# Patient Record
Sex: FEMALE | Race: BLACK OR AFRICAN AMERICAN | Employment: FULL TIME | URBAN - METROPOLITAN AREA
[De-identification: names, ages, dates, MRNs, and addresses within clinical notes are randomized per-mention and may not be internally consistent; named-entity substitution may affect disease eponyms.]

---

## 2021-03-10 ENCOUNTER — HOSPITAL ENCOUNTER (OUTPATIENT)
Dept: WOMENS IMAGING | Age: 61
Discharge: HOME | End: 2021-03-10
Payer: OTHER GOVERNMENT

## 2021-03-10 DIAGNOSIS — Z12.31 BREAST CANCER SCREENING BY MAMMOGRAM: ICD-10-CM

## 2021-03-10 PROCEDURE — 77067 SCR MAMMO BI INCL CAD: CPT

## 2021-04-05 ENCOUNTER — TELEPHONE (OUTPATIENT)
Dept: WOMENS IMAGING | Age: 61
End: 2021-04-05

## 2021-04-29 ENCOUNTER — HOSPITAL ENCOUNTER (OUTPATIENT)
Dept: WOMENS IMAGING | Age: 61
Discharge: HOME | End: 2021-04-29
Payer: OTHER GOVERNMENT

## 2021-04-29 ENCOUNTER — HOSPITAL ENCOUNTER (OUTPATIENT)
Dept: ULTRASOUND IMAGING | Age: 61
Discharge: HOME | End: 2021-04-29
Payer: OTHER GOVERNMENT

## 2021-04-29 DIAGNOSIS — R92.8 ABNORMAL MAMMOGRAM: ICD-10-CM

## 2021-04-29 PROCEDURE — 76642 ULTRASOUND BREAST LIMITED: CPT

## 2021-04-29 PROCEDURE — 77065 DX MAMMO INCL CAD UNI: CPT

## 2021-05-04 ENCOUNTER — HOSPITAL ENCOUNTER (EMERGENCY)
Age: 61
Discharge: HOME | End: 2021-05-04
Payer: OTHER GOVERNMENT

## 2021-05-04 ENCOUNTER — APPOINTMENT (OUTPATIENT)
Dept: GENERAL RADIOLOGY | Age: 61
End: 2021-05-04
Payer: OTHER GOVERNMENT

## 2021-05-04 VITALS
WEIGHT: 224 LBS | DIASTOLIC BLOOD PRESSURE: 113 MMHG | HEART RATE: 78 BPM | OXYGEN SATURATION: 96 % | TEMPERATURE: 98 F | RESPIRATION RATE: 12 BRPM | BODY MASS INDEX: 39.69 KG/M2 | HEIGHT: 63 IN | SYSTOLIC BLOOD PRESSURE: 185 MMHG

## 2021-05-04 DIAGNOSIS — J06.9 URI WITH COUGH AND CONGESTION: Primary | ICD-10-CM

## 2021-05-04 PROCEDURE — 99284 EMERGENCY DEPT VISIT MOD MDM: CPT

## 2021-05-04 PROCEDURE — 6370000000 HC RX 637 (ALT 250 FOR IP): Performed by: PHYSICIAN ASSISTANT

## 2021-05-04 PROCEDURE — 94640 AIRWAY INHALATION TREATMENT: CPT

## 2021-05-04 PROCEDURE — 94760 N-INVAS EAR/PLS OXIMETRY 1: CPT

## 2021-05-04 PROCEDURE — 71045 X-RAY EXAM CHEST 1 VIEW: CPT

## 2021-05-04 RX ORDER — IPRATROPIUM BROMIDE AND ALBUTEROL SULFATE 2.5; .5 MG/3ML; MG/3ML
1 SOLUTION RESPIRATORY (INHALATION) ONCE
Status: COMPLETED | OUTPATIENT
Start: 2021-05-04 | End: 2021-05-04

## 2021-05-04 RX ORDER — PREDNISONE 20 MG/1
60 TABLET ORAL ONCE
Status: COMPLETED | OUTPATIENT
Start: 2021-05-04 | End: 2021-05-04

## 2021-05-04 RX ORDER — GUAIFENESIN AND CODEINE PHOSPHATE 100; 10 MG/5ML; MG/5ML
5 SOLUTION ORAL 3 TIMES DAILY PRN
Qty: 75 ML | Refills: 0 | Status: SHIPPED | OUTPATIENT
Start: 2021-05-04 | End: 2021-05-09

## 2021-05-04 RX ORDER — PREDNISONE 10 MG/1
60 TABLET ORAL DAILY
Qty: 30 TABLET | Refills: 0 | Status: SHIPPED | OUTPATIENT
Start: 2021-05-04 | End: 2021-05-09

## 2021-05-04 RX ORDER — ALBUTEROL SULFATE 90 UG/1
AEROSOL, METERED RESPIRATORY (INHALATION)
Qty: 1 INHALER | Refills: 0 | Status: SHIPPED | OUTPATIENT
Start: 2021-05-04

## 2021-05-04 RX ADMIN — IPRATROPIUM BROMIDE AND ALBUTEROL SULFATE 1 AMPULE: .5; 3 SOLUTION RESPIRATORY (INHALATION) at 09:00

## 2021-05-04 RX ADMIN — PREDNISONE 60 MG: 20 TABLET ORAL at 08:38

## 2021-05-04 ASSESSMENT — ENCOUNTER SYMPTOMS
VOMITING: 0
EYE REDNESS: 0
TROUBLE SWALLOWING: 0
ABDOMINAL PAIN: 0
VOICE CHANGE: 0
NAUSEA: 0
COLOR CHANGE: 0
CHEST TIGHTNESS: 0
CONSTIPATION: 0
BACK PAIN: 0
DIARRHEA: 0
EYE DISCHARGE: 0
RHINORRHEA: 0
SINUS PAIN: 0
COUGH: 1
SINUS PRESSURE: 0
SORE THROAT: 0
SHORTNESS OF BREATH: 0

## 2021-05-04 NOTE — ED PROVIDER NOTES
[address] [facility]        Pt Name: Graciela Stewart  MRN: [id number]  Birthdate 1960  Date of evaluation: 5/4/2021  Provider: OMAR Melgar  PCP: No primary care provider on file. ALLIE. I have evaluated this patient. My supervising physician was available for consultation. CHIEF COMPLAINT       Chief Complaint   Patient presents with    Cough     Patient with complaints of dry cough x 3 days. Denies fever. HISTORY OF PRESENT ILLNESS   (Location, Timing/Onset, Context/Setting, Quality, Duration, Modifying Factors, Severity, Associated Signs and Symptoms)  Note limiting factors. Graciela Stewart is a 61 y.o. female with past medical history of asthma who presents to the ED with complaint of upper respiratory symptoms. Patient that she has had dry nonproductive cough for the past 3 days. States she has had some chest congestion. Patient that she been taking over-the-counter Mucinex with minimal improvement of symptoms. Patient became concerned and came to the ED for further evaluation and treatment. Patient she does have a history of asthma and states normally around this time of year when the weather changes she gets flareups of her asthma that appears similar to today. Patient states she normally gets a chest congestion with associated cough. Patient denies any sick contacts or recent travel. Denies any significant wheezing, shortness of breath, chest pain, pleuritic pain, orthopnea, pedal edema or calf tenderness. Denies any fever or chills. Denies any known exposure to COVID-19/coronavirus. Denies rhinorrhea, congestion, sinus pressure/pain, ear pain/drainage, headache, abdominal pain, nausea/vomiting, urinary symptoms or changes in bowel movements. Denies any rashes or lesions. Nursing Notes were all reviewed and agreed with or any disagreements were addressed in the HPI.     REVIEW OF SYSTEMS (2-9 systems for level 4, 10 or more for level 5)     Review of Systems   Constitutional: Negative for activity change, appetite change, chills, diaphoresis, fatigue and fever. HENT: Negative for congestion, ear discharge, ear pain, postnasal drip, rhinorrhea, sinus pressure, sinus pain, sore throat, trouble swallowing and voice change. Eyes: Negative for discharge and redness. Respiratory: Positive for cough. Negative for chest tightness and shortness of breath. Cardiovascular: Negative. Negative for chest pain, palpitations and leg swelling. Gastrointestinal: Negative for abdominal pain, constipation, diarrhea, nausea and vomiting. Genitourinary: Negative for decreased urine volume, difficulty urinating, dysuria, flank pain, frequency, hematuria and urgency. Musculoskeletal: Negative for arthralgias, back pain, myalgias, neck pain and neck stiffness. Skin: Negative for color change, pallor, rash and wound. Neurological: Negative for dizziness, light-headedness and headaches. Positives and Pertinent negatives as per HPI. Except as noted above in the ROS, all other systems were reviewed and negative. PAST MEDICAL HISTORY     Past Medical History:   Diagnosis Date    Asthma          SURGICAL HISTORY     Past Surgical History:   Procedure Laterality Date    TUBAL LIGATION           CURRENTMEDICATIONS       Discharge Medication List as of 5/4/2021  9:46 AM            ALLERGIES     Iodine    FAMILYHISTORY     History reviewed. No pertinent family history.        SOCIAL HISTORY       Social History     Tobacco Use    Smoking status: Former Smoker   Substance Use Topics    Alcohol use: Not Currently    Drug use: Never       SCREENINGS             PHYSICAL EXAM    (up to 7 for level 4, 8 or more for level 5)     ED Triage Vitals [05/04/21 0723]   BP Temp Temp Source Pulse Resp SpO2 Height Weight   (!) 185/113 98 °F (36.7 °C) Infrared 78 12 95 % 5' 3\" (1.6 m) 224 lb (101.6 kg) Coloration: Skin is not pale. Findings: No erythema or rash. Neurological:      Mental Status: She is alert and oriented to person, place, and time. Psychiatric:         Behavior: Behavior normal.         DIAGNOSTIC RESULTS   LABS:    Labs Reviewed - No data to display    All other labs were within normal range or not returned as of this dictation. EKG: All EKG's are interpreted by the Emergency Department Physician in the absence of a cardiologist.  Please see their note for interpretation of EKG. RADIOLOGY:   Non-plain film images such as CT, Ultrasound and MRI are read by the radiologist. Plain radiographic images are visualized and preliminarily interpreted by the ED Provider with the below findings:        Interpretation per the Radiologist below, if available at the time of this note:    XR CHEST PORTABLE   Final Result   No acute abnormality. Xr Chest Portable    Result Date: 5/4/2021  EXAMINATION: ONE XRAY VIEW OF THE CHEST 5/4/2021 8:38 am COMPARISON: None. HISTORY: ORDERING SYSTEM PROVIDED HISTORY: cough TECHNOLOGIST PROVIDED HISTORY: Reason for exam:->cough Reason for Exam: Cough (Patient with complaints of dry cough x 3 days. Denies fever. ) Acuity: Acute Type of Exam: Initial FINDINGS: The lungs are clear. The mediastinum and cardiac silhouette are unremarkable. No acute abnormality. Us Breast Limited Right    Result Date: 4/29/2021  EXAMINATION: DIAGNOSTIC DIGITAL RIGHT BREAST MAMMOGRAM WITH TOMOSYNTHESIS; TARGETED ULTRASOUND OF THE RIGHT BREAST, 4/29/2021 7:13 am TECHNIQUE: Diagnostic mammography of the right breast was performed with tomosynthesis. 2D standard and 3D tomosynthesis combination imaging performed through the right breast.  Computer aided detection was utilized in the interpretation of this exam.; Targeted ultrasound of the right breast was performed.  Views: Spot CC, MLO, mL COMPARISON: 2021 HISTORY: ORDERING SYSTEM PROVIDED HISTORY: Abnormal mammogram FINDINGS: Mammogram: There are scattered areas of fibroglandular density. Right breast: There is a 2.0 cm focal asymmetry in the upper outer quadrant right breast at mid depth which corresponds to the previous mammographic finding. Ultrasound: Right breast: No abnormal sonographic findings in the upper outer quadrant right breast correlate with the mammographic finding. Specifically, no solid masses, cysts or fluid collections in the scanned areas. Probably benign 2.0 cm focal asymmetry in the right breast without definite sonographic correlate. This focal asymmetry is of unknown chronicity since priors were not available at the time of the studies. Follow-up diagnostic right mammogram in 6 months is recommended to demonstrate stability. In the meantime, will continue to obtain priors from outside facility, the patient was informed that changes/addendum may be made once the priors become available for comparison. BIRADS: BIRADS - CATEGORY 3 Probably Benign Findings. A short interval follow-up is recommended in 6 months. OVERALL ASSESSMENT - PROBABLY BENIGN. The results were discussed with the patient at time of the studies. A letter of notification will be sent to the patient regarding the results. [facility] [place] Unilateral Right    Result Date: 4/29/2021  EXAMINATION: DIAGNOSTIC DIGITAL RIGHT BREAST MAMMOGRAM WITH TOMOSYNTHESIS; TARGETED ULTRASOUND OF THE RIGHT BREAST, 4/29/2021 7:13 am TECHNIQUE: Diagnostic mammography of the right breast was performed with tomosynthesis. 2D standard and 3D tomosynthesis combination imaging performed through the right breast.  Computer aided detection was utilized in the interpretation of this exam.; Targeted ultrasound of the right breast was performed. Views: Spot CC, MLO, mL COMPARISON: 2021 HISTORY: ORDERING SYSTEM PROVIDED HISTORY: Abnormal mammogram FINDINGS: Mammogram: There are scattered areas of fibroglandular density.  Right breast: There is a 2.0 cm focal asymmetry in the upper outer quadrant right breast at mid depth which corresponds to the previous mammographic finding. Ultrasound: Right breast: No abnormal sonographic findings in the upper outer quadrant right breast correlate with the mammographic finding. Specifically, no solid masses, cysts or fluid collections in the scanned areas. Probably benign 2.0 cm focal asymmetry in the right breast without definite sonographic correlate. This focal asymmetry is of unknown chronicity since priors were not available at the time of the studies. Follow-up diagnostic right mammogram in 6 months is recommended to demonstrate stability. In the meantime, will continue to obtain priors from outside facility, the patient was informed that changes/addendum may be made once the priors become available for comparison. BIRADS: BIRADS - CATEGORY 3 Probably Benign Findings. A short interval follow-up is recommended in 6 months. OVERALL ASSESSMENT - PROBABLY BENIGN. The results were discussed with the patient at time of the studies. A letter of notification will be sent to the patient regarding the results. PROCEDURES   Unless otherwise noted below, none     Procedures    CRITICAL CARE TIME   N/A    CONSULTS:  None      EMERGENCY DEPARTMENT COURSE and DIFFERENTIAL DIAGNOSIS/MDM:   Vitals:    Vitals:    05/04/21 0723 05/04/21 0901   BP: (!) 185/113    Pulse: 78    Resp: 12 12   Temp: 98 °F (36.7 °C)    TempSrc: Infrared    SpO2: 95% 96%   Weight: 224 lb (101.6 kg)    Height: 5' 3\" (1.6 m)        Patient was given the following medications:  Medications   predniSONE (DELTASONE) tablet 60 mg (60 mg Oral Given 5/4/21 0838)   ipratropium-albuterol (DUONEB) nebulizer solution 1 ampule (1 ampule Inhalation Given 5/4/21 0900)           Patient is a 24-year-old female who presents to the ED with complaint of upper respiratory symptoms.   Upon examination patient is elevated blood pressure but remaining vital signs unremarkable. Patient informed of elevated blood pressure here in the ED and need for outpatient management/follow-up. Chest x-ray obtained and showed no acute abnormality. Rhonchorous lung sounds at the bases with expiratory wheezing. Given prednisone and breathing treatment here in the ED. Upon repeat evaluation patient states she feels better. Believe patient most likely some from cough which could be due to upper respiratory illness but also could be due to patient's history of asthma. Will give steroids for home. Continue home guaifenesin. Also given albuterol. Patient that she is having problems sleeping secondary to cough. Will give small prescription for guaifenesin with codeine to help with sleep at night. Patient referred to PCP. Do not believe further work-up or imaging indicated at this time. Low suspicion for influenza, [place], COVID-19, ACS, PE, dissection, AAA, pneumonia, pneumothorax respiratory stress, GERD, anxiety, CHF, COPD, asthma, surgical abdomen or other emergent etiology at this time. FINAL IMPRESSION      1. URI with cough and congestion          DISPOSITION/PLAN   DISPOSITION Decision To Discharge 05/04/2021 09:42:38 AM      PATIENT REFERREDTO:  [facility] Emergency Department  [facility]  [address]  Go to   As needed, If symptoms worsen    [facility]) Pre-Services  [phone]          DISCHARGE MEDICATIONS:  Discharge Medication List as of 5/4/2021  9:46 AM      START taking these medications    Details   predniSONE (DELTASONE) 10 MG tablet Take 6 tablets by mouth daily for 5 doses, Disp-30 tablet, R-0Normal      albuterol sulfate  (90 Base) MCG/ACT inhaler Use 2 puffs 4 times daily for 7 days then as needed for wheezing. Dispense with Spacer and instruct in use. At patient's preference may use 60 dose MDI.  May Sub Pro-Air or Proventil as needed per insurance., Disp-1 Inhaler, R-0, DAWNormal guaiFENesin-codeine (TUSSI-ORGANIDIN NR) 100-10 MG/5ML syrup Take 5 mLs by mouth 3 times daily as needed for Cough for up to 5 days. , Disp-75 mL, R-0Print             DISCONTINUED MEDICATIONS:  Discharge Medication List as of 5/4/2021  9:46 AM                 (Please note that portions of this note were completed with a voice recognition program.  Efforts were made to edit the dictations but occasionally words are mis-transcribed.)    OMAR Stewart (electronically signed)          OMAR Dawson  05/04/21 1014

## 2021-05-15 ENCOUNTER — HOSPITAL ENCOUNTER (EMERGENCY)
Age: 61
Discharge: LEFT BEFORE DISCHARGE | End: 2021-05-15
Payer: COMMERCIAL

## 2022-01-25 ENCOUNTER — HOSPITAL ENCOUNTER (OUTPATIENT)
Dept: WOMENS IMAGING | Age: 62
Discharge: HOME | End: 2022-01-25
Payer: OTHER GOVERNMENT

## 2022-01-25 VITALS — BODY MASS INDEX: 40.48 KG/M2 | WEIGHT: 220 LBS | HEIGHT: 62 IN

## 2022-01-25 DIAGNOSIS — R92.8 ABNORMAL MAMMOGRAM: ICD-10-CM

## 2022-01-25 PROCEDURE — G0279 TOMOSYNTHESIS, MAMMO: HCPCS

## 2023-06-10 ENCOUNTER — HOSPITAL ENCOUNTER (OUTPATIENT)
Dept: WOMENS IMAGING | Age: 63
Discharge: HOME | End: 2023-06-10
Payer: OTHER GOVERNMENT

## 2023-06-10 VITALS — WEIGHT: 220 LBS | HEIGHT: 63 IN | BODY MASS INDEX: 38.98 KG/M2

## 2023-06-10 DIAGNOSIS — Z12.31 ENCOUNTER FOR SCREENING MAMMOGRAM FOR BREAST CANCER: ICD-10-CM

## 2023-06-10 PROCEDURE — 77063 BREAST TOMOSYNTHESIS BI: CPT
